# Patient Record
Sex: MALE | Race: BLACK OR AFRICAN AMERICAN | Employment: FULL TIME | URBAN - METROPOLITAN AREA
[De-identification: names, ages, dates, MRNs, and addresses within clinical notes are randomized per-mention and may not be internally consistent; named-entity substitution may affect disease eponyms.]

---

## 2018-11-27 ENCOUNTER — HOSPITAL ENCOUNTER (OUTPATIENT)
Dept: GENERAL RADIOLOGY | Age: 58
Discharge: HOME | End: 2018-11-27
Payer: COMMERCIAL

## 2018-11-27 DIAGNOSIS — M54.41 ACUTE BACK PAIN WITH SCIATICA, RIGHT: ICD-10-CM

## 2018-11-27 PROCEDURE — 72100 X-RAY EXAM L-S SPINE 2/3 VWS: CPT

## 2018-11-27 PROCEDURE — 72070 X-RAY EXAM THORAC SPINE 2VWS: CPT

## 2018-11-27 PROCEDURE — 72040 X-RAY EXAM NECK SPINE 2-3 VW: CPT

## 2018-12-19 ENCOUNTER — OFFICE VISIT (OUTPATIENT)
Dept: CARDIOLOGY CLINIC | Age: 58
End: 2018-12-19

## 2018-12-19 VITALS
HEIGHT: 68 IN | WEIGHT: 237 LBS | BODY MASS INDEX: 35.92 KG/M2 | OXYGEN SATURATION: 98 % | DIASTOLIC BLOOD PRESSURE: 86 MMHG | SYSTOLIC BLOOD PRESSURE: 162 MMHG | HEART RATE: 73 BPM

## 2018-12-19 DIAGNOSIS — R00.2 PALPITATION: Primary | ICD-10-CM

## 2018-12-19 PROBLEM — E66.01 SEVERE OBESITY (HCC): Status: ACTIVE | Noted: 2018-12-19

## 2018-12-19 RX ORDER — ATORVASTATIN CALCIUM 10 MG/1
TABLET, FILM COATED ORAL
Refills: 1 | COMMUNITY
Start: 2018-11-30

## 2018-12-19 RX ORDER — METOPROLOL TARTRATE 100 MG/1
TABLET ORAL
Refills: 5 | COMMUNITY
Start: 2018-11-19

## 2018-12-19 RX ORDER — ASPIRIN 81 MG/1
TABLET ORAL DAILY
COMMUNITY

## 2018-12-19 RX ORDER — HYDROCHLOROTHIAZIDE 25 MG/1
TABLET ORAL
Refills: 0 | COMMUNITY
Start: 2018-12-11

## 2018-12-19 RX ORDER — AMLODIPINE BESYLATE 10 MG/1
TABLET ORAL
Refills: 1 | COMMUNITY
Start: 2018-12-08

## 2018-12-19 RX ORDER — LISINOPRIL 20 MG/1
TABLET ORAL
Refills: 1 | COMMUNITY
Start: 2018-11-19

## 2018-12-19 RX ORDER — ALBUTEROL SULFATE 90 UG/1
AEROSOL, METERED RESPIRATORY (INHALATION)
Refills: 0 | COMMUNITY
Start: 2018-11-12

## 2018-12-19 NOTE — PATIENT INSTRUCTIONS
Low Sodium Diet (2,000 Milligram): Care Instructions  Your Care Instructions    Too much sodium causes your body to hold on to extra water. This can raise your blood pressure and force your heart and kidneys to work harder. In very serious cases, this could cause you to be put in the hospital. It might even be life-threatening. By limiting sodium, you will feel better and lower your risk of serious problems. The most common source of sodium is salt. People get most of the salt in their diet from canned, prepared, and packaged foods. Fast food and restaurant meals also are very high in sodium. Your doctor will probably limit your sodium to less than 2,000 milligrams (mg) a day. This limit counts all the sodium in prepared and packaged foods and any salt you add to your food. Follow-up care is a key part of your treatment and safety. Be sure to make and go to all appointments, and call your doctor if you are having problems. It's also a good idea to know your test results and keep a list of the medicines you take. How can you care for yourself at home? Read food labels  · Read labels on cans and food packages. The labels tell you how much sodium is in each serving. Make sure that you look at the serving size. If you eat more than the serving size, you have eaten more sodium. · Food labels also tell you the Percent Daily Value for sodium. Choose products with low Percent Daily Values for sodium. · Be aware that sodium can come in forms other than salt, including monosodium glutamate (MSG), sodium citrate, and sodium bicarbonate (baking soda). MSG is often added to Asian food. When you eat out, you can sometimes ask for food without MSG or added salt. Buy low-sodium foods  · Buy foods that are labeled \"unsalted\" (no salt added), \"sodium-free\" (less than 5 mg of sodium per serving), or \"low-sodium\" (less than 140 mg of sodium per serving).  Foods labeled \"reduced-sodium\" and \"light sodium\" may still have too much sodium. Be sure to read the label to see how much sodium you are getting. · Buy fresh vegetables, or frozen vegetables without added sauces. Buy low-sodium versions of canned vegetables, soups, and other canned goods. Prepare low-sodium meals  · Cut back on the amount of salt you use in cooking. This will help you adjust to the taste. Do not add salt after cooking. One teaspoon of salt has about 2,300 mg of sodium. · Take the salt shaker off the table. · Flavor your food with garlic, lemon juice, onion, vinegar, herbs, and spices. Do not use soy sauce, lite soy sauce, steak sauce, onion salt, garlic salt, celery salt, mustard, or ketchup on your food. · Use low-sodium salad dressings, sauces, and ketchup. Or make your own salad dressings and sauces without adding salt. · Use less salt (or none) when recipes call for it. You can often use half the salt a recipe calls for without losing flavor. Other foods such as rice, pasta, and grains do not need added salt. · Rinse canned vegetables, and cook them in fresh water. This removes some--but not all--of the salt. · Avoid water that is naturally high in sodium or that has been treated with water softeners, which add sodium. Call your local water company to find out the sodium content of your water supply. If you buy bottled water, read the label and choose a sodium-free brand. Avoid high-sodium foods  · Avoid eating:  ? Smoked, cured, salted, and canned meat, fish, and poultry. ? Ham, zigeler, hot dogs, and luncheon meats. ? Regular, hard, and processed cheese and regular peanut butter. ? Crackers with salted tops, and other salted snack foods such as pretzels, chips, and salted popcorn. ? Frozen prepared meals, unless labeled low-sodium. ? Canned and dried soups, broths, and bouillon, unless labeled sodium-free or low-sodium. ? Canned vegetables, unless labeled sodium-free or low-sodium. ? Western [employer]i fries, pizza, tacos, and other fast foods.   ? Ramesh Diaz, olives, ketchup, and other condiments, especially soy sauce, unless labeled sodium-free or low-sodium. Where can you learn more? Go to http://leanne-linda.info/. Enter J921 in the search box to learn more about \"Low Sodium Diet (2,000 Milligram): Care Instructions. \"  Current as of: March 29, 2018  Content Version: 11.8  © 9002-3963 [employer]. Care instructions adapted under license by [employer] (which disclaims liability or warranty for this information). If you have questions about a medical condition or this instruction, always ask your healthcare professional. [address] any warranty or liability for your use of this information.

## 2018-12-20 DIAGNOSIS — I42.9 CARDIOMYOPATHY, UNSPECIFIED TYPE (HCC): Primary | ICD-10-CM

## 2018-12-26 ENCOUNTER — TELEPHONE (OUTPATIENT)
Dept: CARDIOLOGY CLINIC | Age: 58
End: 2018-12-26

## 2018-12-26 NOTE — TELEPHONE ENCOUNTER
Made aware by  due to patient's insurance his cardiac stress test has to be scheduled at [facility] facility. Patient made aware at patient requested testing set up with [facility] on 1/3/18 @ 8:00am, patient is to be npo after midnight on 1/2/18, patient may bring medications to appt do not take any meds prior to testing, wear comfortable clothes, bring id and insurance card. This has been fully explained to the patient, who indicates understanding.

## 2019-01-07 DIAGNOSIS — I42.9 CARDIOMYOPATHY, UNSPECIFIED TYPE (HCC): Primary | ICD-10-CM

## 2019-01-08 ENCOUNTER — TELEPHONE (OUTPATIENT)
Dept: CARDIOLOGY CLINIC | Age: 59
End: 2019-01-08

## 2019-01-08 NOTE — TELEPHONE ENCOUNTER
Lm on  for patient to give office a call. Patient pharm nuc test schedule at [facility] January 14, 2019 at [address] [facility]  Phone number for scheduling if this date does not work [phone]. Please inform patient when he returns call to office.

## 2019-01-09 NOTE — TELEPHONE ENCOUNTER
Patient returned call verified name and , This has been fully explained to the patient, who indicates understanding.

## 2019-09-16 NOTE — PROGRESS NOTES
Increase your fluid intake. Return to the ED if your condition changes, progresses, or if you have any concerns.     Lois Rodriguez    Chief Complaint   Patient presents with   [address] [place] [address] Patient     ref by PCP     HPI    Lois Rodriguez is a 63 y.o. AAM here for evaluation of \"irregular heart beat. \"  As you know, Mr. Dennis Alford has no known heart disease. He tells me he was at his primary care doctor's office when they noticed his heart beat was irregular. He says this led to an echocardiogram. Unfortunately I have no records at the time of my evaluation. Patient does have a history of hypertension but says he did not take all of his medications today. I spent quite a bit of time with him, really prying into any possible symptoms. He denies CP, SOB, palpitations, and syncope. He says he has gained ~20-30 lbs and is really out of shape. He is on his feet a lot and has noticed some swelling over the summer when it was hot after a long day but it always resolved quickly. I specifically asked about palpitations- and he denies ever feeling his heart skipping, flipping, pounding or racing. No past medical history on file. HTN, HL    No past surgical history on file. Current Outpatient Medications   Medication Sig Dispense Refill    metoprolol tartrate (LOPRESSOR) 100 mg IR tablet TAKE 1 TABLET BY MOUTH TWICE A DAY  5    lisinopril (PRINIVIL, ZESTRIL) 20 mg tablet TAKE 1 TABLET BY MOUTH TWICE DAILY FOR BLOOD PRESSURE - THIS IS A DOSE INCREASE  1    hydroCHLOROthiazide (HYDRODIURIL) 25 mg tablet TAKE 1 TABLET BY MOUTH EVERY DAY  0    atorvastatin (LIPITOR) 10 mg tablet TAKE 1 TABLET BY MOUTH EVERYDAY AT BEDTIME  1    amLODIPine (NORVASC) 10 mg tablet TAKE 1 TABLET BY MOUTH EVERY DAY FOR BLOOD PRESSURE  1    VENTOLIN HFA 90 mcg/actuation inhaler TAKE 1 PUFF BY MOUTH EVERY 4 TO 6 HOURS AS NEEDED  0    aspirin delayed-release 81 mg tablet Take  by mouth daily.          Allergies not on file    Social History     Socioeconomic History    Marital status: SINGLE     Spouse name: Not on file    Number of children: Not on file    Years of education: Not on file    Highest education level: Not on file   Social Needs    Financial resource strain: Not on file    Food insecurity - worry: Not on file    Food insecurity - inability: Not on file    Transportation needs - medical: Not on file    Transportation needs - non-medical: Not on file   Occupational History    Not on file   Tobacco Use    Smoking status: Not on file   Substance and Sexual Activity    Alcohol use: Not on file    Drug use: Not on file    Sexual activity: Not on file   Other Topics Concern    Not on file   Social History Narrative    Not on file      FH negative for premature CAD and SCD. Review of Systems    14 pt Review of Systems is negative unless otherwise mentioned in the HPI. Says he snores \"only when on his back\" and his spouse says he does not have sleep apnea    Wt Readings from Last 3 Encounters:   12/19/18 107.5 kg (237 lb)     Temp Readings from Last 3 Encounters:   No data found for Temp     BP Readings from Last 3 Encounters:   12/19/18 162/86     Pulse Readings from Last 3 Encounters:   12/19/18 73     Physical Exam:    Visit Vitals  /86   Pulse 73   Ht 5' 8\" (1.727 m)   Wt 107.5 kg (237 lb)   SpO2 98%   BMI 36.04 kg/m²      Physical Exam   Constitutional: He is oriented to person, place, and time. He appears well-developed and well-nourished. No distress. HENT:   Head: Normocephalic and atraumatic.   mallamapti 3-4   Eyes: EOM are normal. Pupils are equal, round, and reactive to light. No scleral icterus. Neck: No JVD present. Cardiovascular: Normal rate, regular rhythm, normal heart sounds and intact distal pulses. Exam reveals no gallop and no friction rub. No murmur heard. Pulmonary/Chest: Effort normal and breath sounds normal. No respiratory distress. He has no wheezes. He has no rales. He exhibits no tenderness. Abdominal: Soft. Bowel sounds are normal.   Musculoskeletal: He exhibits no edema.    Neurological: He is alert and oriented to person, place, and time. Skin: Skin is warm and dry. No rash noted. He is not diaphoretic. Psychiatric: He has a normal mood and affect. EKG today shows: NSR, leftward axis and intervals, nonspecific ST segment abnormalities    Impression and Plan:  Mark Hansen is a 63 y.o. with:    1.) Abnormal echo/ new cardiomyopathy  2.) HTN, avg sbp not at goal today (but didn't take all his meds)  3.) HL, known  4.) Signs and symptoms suggestive of sleep disordered breathing    1.) Requested recent echo results, and did not have results at time of encounter. I received it after patient left. Will call him to discuss results and plan for stress test with follow up appointment. Will r/o ischemia first and proceed in stepwise process  2.) Low sodium diet  3.) Consider sleep apnea testing  4.) Weight loss with diet and exercise discussed  5.) RTC after testing    Though he insists he is asymptomatic- I see he has a newly recognized cardiomyopathy by echo report. This could be from longstanding uncontrolled HTN but we need to r/o ischemia. We will proceed in a stepwise manner. I left him a message to call back but we will get a hold of him and set him up for nuclear stress test and I will see him back in the office after that for further recommendations/ medications if needed. Thank you for allowing me to participate in the care of your patient, please do not hesitate to call with questions or concerns. Follow-up Disposition:  Return if symptoms worsen or fail to improve.     Nabeel Bloom, DO

## 2022-03-19 PROBLEM — E66.01 SEVERE OBESITY (HCC): Status: ACTIVE | Noted: 2018-12-19

## 2023-01-31 RX ORDER — ALBUTEROL SULFATE 90 UG/1
AEROSOL, METERED RESPIRATORY (INHALATION)
COMMUNITY
Start: 2018-11-12

## 2023-01-31 RX ORDER — LISINOPRIL 20 MG/1
TABLET ORAL
COMMUNITY
Start: 2018-11-19

## 2023-01-31 RX ORDER — HYDROCHLOROTHIAZIDE 25 MG/1
TABLET ORAL
COMMUNITY
Start: 2018-12-11

## 2023-01-31 RX ORDER — ATORVASTATIN CALCIUM 10 MG/1
TABLET, FILM COATED ORAL
COMMUNITY
Start: 2018-11-30

## 2023-01-31 RX ORDER — ASPIRIN 81 MG/1
TABLET ORAL DAILY
COMMUNITY

## 2023-01-31 RX ORDER — METOPROLOL TARTRATE 100 MG/1
TABLET ORAL
COMMUNITY
Start: 2018-11-19

## 2023-01-31 RX ORDER — AMLODIPINE BESYLATE 10 MG/1
TABLET ORAL
COMMUNITY
Start: 2018-12-08